# Patient Record
(demographics unavailable — no encounter records)

---

## 2025-02-13 NOTE — ASSESSMENT
[FreeTextEntry1] : 76 y/o F with recent diagnosis of HTN, presents for evaluation of headaches.  Headaches noted since November, at times can be with associated allodynia, but moreso a constant pressure/throbbing sensation in the head.  I suspect this may be related to her elevated BP as she states she has never been previously diagnosed with HTN except for past few weeks.  Despite medication recently started and taking today, she is still >180.    PLAN: -CT head to evaluate for any obvious intracranial pathology - Increase Losartan to 75mg daily, instructed to take an extra 25mg today.  She has an appt with PCP gloria at noon.  - If Headaches still persist with normal BP, will obtain MRI Brain, MRA head - We also discussed general symptoms that should prompt immediate presentation to the emergency department for evaluation of acute stroke including: sudden onset of focal weakness, numbness, difficulty with speech production or comprehension, slurred speech, visual changes, gait imbalance, and/or sudden/severe headache.

## 2025-02-13 NOTE — PHYSICAL EXAM
[FreeTextEntry1] :   General: Cooperative, NAD HEENT: NC/AT, no carotid bruits, + temporal artery pulsations intact, no scalp tenderness, neg tinel's sign, TM intact b/l Lungs: CTAB Chest: RRR, no murmurs Extremities: nontender, no erythema Neurological Examination: MS: AOx3. Appropriately interactive, normal affect. Speech fluent w/o paraphasic errors CN: No papilledema, PERLL, EOMI, V1-3 sensation intact, face symmetric, hearing intact, palate elevates symmetrically, tongue midline, SCM equal bilaterally Motor: normal bulk and tone, no tremor, rigidity or bradykinesia.  5/5 all over Sens: Intact to light touch. Reflexes: 2/4 all over, downgoing toes b/l Coord:  No dysmetria, AFM intact Gait: Normal

## 2025-02-13 NOTE — HISTORY OF PRESENT ILLNESS
[FreeTextEntry1] :  [facility] NEUROLOGY AT [place]  CC: Headache HPI: 74 y/o F with recent diagnosis of HTN, presents for evaluation of new onset of headaches.  2/13/25: Began to have left sided headaches in November with associated alloydynia.  Mostly noted at night when laying down.  Pain would be intermittent, 5/10, relieved with Tylenol.  No nausea vomiting, no photophobia or phonophobia.  . BP was fine in December.  Then in January headaches came back and noted that BP was elevated. She was started on Losartan 2/4.  This past Saturday, she felt her heart racing and went to the hospital ([place]).  BP was in 160s and referred to cardiology.   Currently nervous but no headaches but feels a constant pulsating feeling on the top of her head. Denies any associated vision changes, no focal deficits.

## 2025-03-07 NOTE — PHYSICAL EXAM
[FreeTextEntry1] :   General: Cooperative, NAD HEENT: NC/AT, no carotid bruits, + temporal artery pulsations intact, no scalp tenderness, neg tinel's sign, TM intact b/l Lungs: CTAB Chest: RRR, no murmurs Extremities: nontender, no erythema Neurological Examination: MS: AOx3. Appropriately interactive, normal affect. Speech fluent w/o paraphasic errors CN: No papilledema, PERLL, EOMI, V1-3 sensation intact, face symmetric, hearing intact, palate elevates symmetrically, tongue midline, SCM equal bilaterally Motor: normal bulk and tone, no tremor, rigidity or bradykinesia.  5/5 all over Sens: Intact to light touch. Reflexes: 2/4 all over, downgoing toes b/l Coord:  No dysmetria, FAM intact Gait: Normal

## 2025-03-07 NOTE — HISTORY OF PRESENT ILLNESS
[FreeTextEntry1] :  [facility] NEUROLOGY AT [place]  CC: Headache HPI: 74 y/o F with recent diagnosis of HTN, presents for evaluation of new onset of headaches.  2/13/25: Began to have left sided headaches in November with associated alloydynia.  Mostly noted at night when laying down.  Pain would be intermittent, 5/10, relieved with Tylenol.  No nausea vomiting, no photophobia or phonophobia.  . BP was fine in December.  Then in January headaches came back and noted that BP was elevated. She was started on Losartan 2/4.  This past Saturday, she felt her heart racing and went to the hospital ([place]).  BP was in 160s and referred to cardiology.   Currently nervous but no headaches but feels a constant pulsating feeling on the top of her head. Denies any associated vision changes, no focal deficits.    Today 3/7/25: CT head was neg.  BP meds were increased at last visit.  Now on second BP med.  Headaches are limited to once a month.  Still has a constant pulsating feeling in her head.

## 2025-03-07 NOTE — ASSESSMENT
[FreeTextEntry1] : 76 y/o F with recent diagnosis of HTN, presents for follow-up of headaches.  Headaches noted since November, at times can be with associated allodynia, but moreso a constant pressure/throbbing sensation in the head.  These have improved and I suspect these were related to her elevated BP and now since improved BP her headaches are limited to once per month.  She still does however c/o contant pulsating feeling so unclear etiology of this and will evaluate for intracranial pathology including vasculature.    PLAN: - MRI Brain, MRA head and neck - Cont. to f/u with Cardio for BP mgmt - Tylenol prn headaches for now   - We also discussed general symptoms that should prompt immediate presentation to the emergency department for evaluation of acute stroke including: sudden onset of focal weakness, numbness, difficulty with speech production or comprehension, slurred speech, visual changes, gait imbalance, and/or sudden/severe headache.

## 2025-04-07 NOTE — REVIEW OF SYSTEMS
Result Note    Component Ref Range & Units    SARS-CoV-2 by PCR Not Detected / Detected / Inhibitor Present Not Detected         Patient notified of negative COVID result.  Discussed  that if he/she has continuing symptoms of illness they should notify their PCP to discuss  further evaluation and or testing. Quarantine, if applicable , was reviewed.   Patient  verbalizes understanding.  All questions answered    [As Noted in HPI] : as noted in HPI [Negative] : Respiratory

## 2025-04-07 NOTE — PHYSICAL EXAM
[FreeTextEntry1] : GENERAL PHYSICAL EXAM: GEN: no distress, normal affect EYES: sclera white, conjunctiva clear, no nystagmus CV: normal rhythm PULM: no respiratory distress, normal rhythm and effort EXT: no edema, no cyanosis SKIN: warm, dry, no rash or lesion on exposed skin   NEUROLOGICAL EXAM: Mental Status Orientation: alert and oriented to person, place, time, and situation Language: clear and fluent, intact comprehension and repetition   Cranial Nerves II: visual fields full to confrontation III, IV, VI: PERRL, EOMI V, VII: facial sensation and movement intact and symmetric VIII: hearing intact IX, X: uvula midline, soft palate elevates normally XI: BL shoulder shrug intact XII: tongue midline   Motor Shoulder abd: 5 (R), 5 (L) EF/EE: 5 (R), 5 (L) WF/WE: 5 (R), 5 (L) hand : 5 (R), 5 (L) HF/HE: 5 (R), 5 (L) KF/KE: 5 (R), 5 (L) DF/PF: 5 (R), 5 (L) Tone and bulk are normal in upper and lower limbs No pronator drift   Sensation Intact to light touch in all 4 EXTs   Coordination Normal FTN bilaterally Able to perform rapid, alternating movements   Gait Normal stance, stride, and pivot turn Tandem walk intact Negative Romberg

## 2025-04-07 NOTE — DISCUSSION/SUMMARY
[FreeTextEntry1] : Ms. Lemus is a 76 year-old woman with PMH HTN who was referred to me by Dr. Lopes for evaluation of cerebral aneurysm. 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 We discussed the risks of having an aneurysm and the chance of an aneurysm causing a SAH. Based on patient's age and the location of the aneurysm, I believe its lifetime risk of rupture is nil and if a rupture would occur, it would not cause a SAH. Therefore, no further intervention is warranted. Will perform CT temporal bone to assess for temporal bone dehiscence as cause of pulsatile tinnitus. If symptoms do not improve, may consider angiogram to assess for dural AVF. She will f/u with me after testing. All of her questions, concerns were addressed.

## 2025-05-02 NOTE — ASSESSMENT
[FreeTextEntry1] : 74 y/o F with recent diagnosis of HTN, presents for follow-up of headaches.  Headaches noted since November, at times can be with associated allodynia, but moreso a constant pressure/throbbing sensation in the head.  These have improved and I suspect these were related to her elevated BP and now since improved BP her headaches are limited to once per month.  She still does however c/o contant pulsating feeling and has dehiscence of semicircular canal.   PLAN: - Repeat MRA in one year - Cont. to f/u with Cardio for BP mgmt - Tylenol prn headaches for now - f/u with ENT   - We also discussed general symptoms that should prompt immediate presentation to the emergency department for evaluation of acute stroke including: sudden onset of focal weakness, numbness, difficulty with speech production or comprehension, slurred speech, visual changes, gait imbalance, and/or sudden/severe headache.

## 2025-05-02 NOTE — HISTORY OF PRESENT ILLNESS
[FreeTextEntry1] :  [facility] NEUROLOGY AT [place]  CC: Headache HPI: 74 y/o F with recent diagnosis of HTN, presents for evaluation of new onset of headaches.  2/13/25: Began to have left sided headaches in November with associated alloydynia.  Mostly noted at night when laying down.  Pain would be intermittent, 5/10, relieved with Tylenol.  No nausea vomiting, no photophobia or phonophobia.  . BP was fine in December.  Then in January headaches came back and noted that BP was elevated. She was started on Losartan 2/4.  This past Saturday, she felt her heart racing and went to the hospital ([place]).  BP was in 160s and referred to cardiology.   Currently nervous but no headaches but feels a constant pulsating feeling on the top of her head. Denies any associated vision changes, no focal deficits.    3/7/25: CT head was neg.  BP meds were increased at last visit.  Now on second BP med.  Headaches are limited to once a month.  Still has a constant pulsating feeling in her head.    Today 5/2/25: MRA resulted in 6mm LICA cavernous aneurysm. Sent for Neuro IR eval--recommended to be lower risk of SAH and sent for CT temporal bone to eval cause of pulsatile tinnitus.  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  Headaches are better.

## 2025-07-13 NOTE — CONSULT LETTER
[Dear  ___] : Dear  [unfilled], [Consult Letter:] : I had the pleasure of evaluating your patient, [unfilled]. [Please see my note below.] : Please see my note below. [Consult Closing:] : Thank you very much for allowing me to participate in the care of this patient.  If you have any questions, please do not hesitate to contact me. [Sincerely,] : Sincerely, [FreeTextEntry2] : Emile Bird MD [FreeTextEntry3] : Kwadwo Guerrero MD, FACS Chair of Otolaryngology, [facility] & [facility] Professor of Otolaryngology & Molecular Medicine, [employer] at [facility]

## 2025-07-13 NOTE — CONSULT LETTER
[Dear  ___] : Dear  [unfilled], [Consult Letter:] : I had the pleasure of evaluating your patient, [unfilled]. [Please see my note below.] : Please see my note below. [Consult Closing:] : Thank you very much for allowing me to participate in the care of this patient.  If you have any questions, please do not hesitate to contact me. [Sincerely,] : Sincerely, [FreeTextEntry2] : Emile Bird MD [FreeTextEntry3] : Kwadwo Guerrero MD, FACS Chair of Otolaryngology, [facility] & [facility] Professor of Otolaryngology & Molecular Medicine, [facility] [facility] [employer] at [facility]

## 2025-07-13 NOTE — DATA REVIEWED
[de-identified] : Type A Tymp, Au. 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 [de-identified] : Carotid cavernous carotid aneurysm 6mm on left - left SSC - [place] - films reviewed.

## 2025-07-13 NOTE — HISTORY OF PRESENT ILLNESS
[de-identified] : 76 year old female referred by Dr. Bird for pulsatile tinnitus for 8 months. Reports noticing "sensitivity" on left side of head, followed by a "pulsating" sensation. Recent dx of cerebral aneurysm. No use of hearing aid.  Denies otorrhea, otalgia, vertigo, dizziness. No family hx of hearing loss or autoimmune disease.  CT scan at [facility] 3/11/25 - possible left SSCD

## 2025-07-13 NOTE — DATA REVIEWED
[de-identified] : Type A Tymp, Au. 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 [de-identified] : Carotid cavernous carotid aneurysm 6mm on left - left SSC - [place] - films reviewed.

## 2025-07-13 NOTE — HISTORY OF PRESENT ILLNESS
[de-identified] : 76 year old female referred by Dr. Bird for pulsatile tinnitus for 8 months. Reports noticing "sensitivity" on left side of head, followed by a "pulsating" sensation. Recent dx of cerebral aneurysm. No use of hearing aid.  Denies otorrhea, otalgia, vertigo, dizziness. No family hx of hearing loss or autoimmune disease.  CT scan at [facility] 3/11/25 - possible left SSCD